# Patient Record
Sex: FEMALE | Race: WHITE | NOT HISPANIC OR LATINO | Employment: FULL TIME | URBAN - METROPOLITAN AREA
[De-identification: names, ages, dates, MRNs, and addresses within clinical notes are randomized per-mention and may not be internally consistent; named-entity substitution may affect disease eponyms.]

---

## 2022-11-11 ENCOUNTER — OFFICE VISIT (OUTPATIENT)
Dept: PODIATRY | Facility: CLINIC | Age: 52
End: 2022-11-11

## 2022-11-11 VITALS — OXYGEN SATURATION: 99 % | HEART RATE: 65 BPM | WEIGHT: 159.4 LBS | BODY MASS INDEX: 26.56 KG/M2 | HEIGHT: 65 IN

## 2022-11-11 DIAGNOSIS — M79.672 BILATERAL FOOT PAIN: ICD-10-CM

## 2022-11-11 DIAGNOSIS — Q82.8 PUNCTATE POROKERATOSIS: Primary | ICD-10-CM

## 2022-11-11 DIAGNOSIS — M24.573 EQUINUS CONTRACTURE OF ANKLE: ICD-10-CM

## 2022-11-11 DIAGNOSIS — M79.671 BILATERAL FOOT PAIN: ICD-10-CM

## 2022-11-11 PROCEDURE — 99203 OFFICE O/P NEW LOW 30 MIN: CPT

## 2022-11-11 RX ORDER — BUPRENORPHINE AND NALOXONE 8; 2 MG/1; MG/1
FILM, SOLUBLE BUCCAL; SUBLINGUAL
COMMUNITY

## 2022-11-11 RX ORDER — NALOXEGOL OXALATE 25 MG/1
TABLET, FILM COATED ORAL
COMMUNITY

## 2022-11-11 RX ORDER — NALOXONE HYDROCHLORIDE 4 MG/.1ML
SPRAY NASAL
COMMUNITY

## 2022-11-11 RX ORDER — ERGOCALCIFEROL 1.25 MG/1
CAPSULE ORAL
COMMUNITY

## 2022-11-11 RX ORDER — BUPRENORPHINE AND NALOXONE 8; 2 MG/1; MG/1
FILM, SOLUBLE BUCCAL; SUBLINGUAL
COMMUNITY
Start: 2022-10-26

## 2022-11-11 RX ORDER — POTASSIUM CHLORIDE 750 MG/1
TABLET, FILM COATED, EXTENDED RELEASE ORAL
COMMUNITY

## 2022-11-11 RX ORDER — BUPRENORPHINE HYDROCHLORIDE AND NALOXONE HYDROCHLORIDE DIHYDRATE 8; 2 MG/1; MG/1
TABLET SUBLINGUAL
COMMUNITY
Start: 2022-08-29

## 2022-11-11 RX ORDER — UREA 40 %
1 CREAM (GRAM) TOPICAL 2 TIMES DAILY
Qty: 60 EACH | Refills: 3
Start: 2022-11-11

## 2022-11-11 RX ORDER — BUPRENORPHINE HYDROCHLORIDE AND NALOXONE HYDROCHLORIDE DIHYDRATE 8; 2 MG/1; MG/1
TABLET SUBLINGUAL
COMMUNITY

## 2022-11-11 RX ORDER — PAROXETINE 10 MG/1
TABLET, FILM COATED ORAL
COMMUNITY

## 2022-11-11 NOTE — PROGRESS NOTES
11/11/2022  Foot and Ankle Surgery - New Patient   Provider: CANDICE Soriano   Location: [facility]    Subjective:  Jacy Andino is a 52 y.o. female.     Chief Complaint   Patient presents with   • Left Foot - Callouses   • Right Foot - Callouses   • Initial Evaluation     A Joe VILLARREAL 10/19/2022       The patient is here today for calluses and corns of feet. She reports they corns may be caused by her new work shoes. She states she has dry feet. She reports sensitivity after ambulating on her feet all day. She states moisturizes her feet twice daily.     No Known Allergies    History reviewed. No pertinent past medical history.    History reviewed. No pertinent surgical history.    Family History   Problem Relation Age of Onset   • Heart disease Mother    • Cancer Father        Social History     Socioeconomic History   • Marital status:    Tobacco Use   • Smoking status: Former     Packs/day: 1.00     Years: 30.00     Pack years: 30.00     Types: Cigarettes     Passive exposure: Never   • Smokeless tobacco: Never   Vaping Use   • Vaping Use: Every day   • Substances: Flavoring   • Devices: RefOrganically Ma[employer]ble tank   Substance and Sexual Activity   • Sexual activity: Defer        Current Outpatient Medications on File Prior to Visit   Medication Sig Dispense Refill   • buprenorphine-naloxone (SUBOXONE) 8-2 MG film film buprenorphine 8 mg-naloxone 2 mg sublingual film   DISSOLVE 1 3/4 FILM UNDER THE TONGUE EVERY DAY     • buprenorphine-naloxone (SUBOXONE) 8-2 MG film film DISSOLVE 2 FILMS UNDER THE TONGUE ONCE DAILY.     • buprenorphine-naloxone (SUBOXONE) 8-2 MG per SL tablet buprenorphine 8 mg-naloxone 2 mg sublingual tablet   TAKE 1.75 TABLETS SUBLINGUALLY ONCE DAILY     • buprenorphine-naloxone (SUBOXONE) 8-2 MG per SL tablet TAKE 1.75 TABLETS SUBLINGUALLY ONCE DAILY     • ergocalciferol (ERGOCALCIFEROL) 1.25 MG (10535 UT) capsule Vitamin D2 1,250 mcg (50,000 unit) capsule   Take 1 capsule every  "week by oral route.     • Naloxegol Oxalate (Movantik) 25 MG tablet Movantik 25 mg tablet   TAKE 1 TABLET BY MOUTH EVERY DAY     • naloxone (NARCAN) 4 MG/0.1ML nasal spray Narcan 4 mg/actuation nasal spray     • PARoxetine (PAXIL) 10 MG tablet paroxetine 10 mg tablet   TAKE 1 TABLET BY MOUTH EVERY DAY IN THE EVENING     • potassium chloride 10 MEQ CR tablet potassium chloride ER 10 mEq tablet,extended release   TAKE 1 TABLET BY MOUTH EVERY DAY       No current facility-administered medications on file prior to visit.       Review of Systems:  General: Denies fever, chills, fatigue, and weakness.  Eyes: Denies vision loss, blurry vision, and excessive redness.  ENT: Denies hearing issues and difficulty swallowing.  Cardiovascular: Denies palpitations, chest pain, or syncopal episodes.  Respiratory: Denies shortness of breath, wheezing, and coughing.  GI: Denies abdominal pain, nausea, and vomiting.   : Denies frequency, hematuria, and urgency.  Musculoskeletal: Denies muscle cramps, joint pains, and stiffness.  Derm: Denies rash, open wounds, or suspicious lesions.  Neuro: Denies headaches, numbness, loss of coordination, and tremors.  Psych: Denies anxiety and depression.  Endocrine: Denies temperature intolerance and changes in appetite.  Heme: Denies bleeding disorders or abnormal bruising.     Objective   Pulse 65   Ht 165.1 cm (65\")   Wt 72.3 kg (159 lb 6.4 oz)   SpO2 99%   BMI 26.53 kg/m²     Foot/Ankle Exam:       General:   Appearance: appears stated age and healthy    Orientation: AAOx3    Affect: appropriate      VASCULAR      Right Foot Vascularity   Normal vascular exam    Dorsalis pedis:  2+  Posterior tibial:  2+  Skin Temperature: warm    Edema Grading:  None  CFT:  < 3 seconds  Pedal Hair Growth:  Present  Varicosities: none       Left Foot Vascularity   Normal vascular exam    Dorsalis pedis:  2+  Posterior tibial:  2+  Skin Temperature: warm    Edema Grading:  None  CFT:  < 3 seconds  Pedal " Hair Growth:  Present  Varicosities: none       MUSCULOSKELETAL      Right Foot Musculoskeletal   Ecchymosis:  None  Tenderness: right foot callus       Left Foot Musculoskeletal   Ecchymosis:  None  Tenderness: left foot callus       MUSCLE STRENGTH     Right Foot Muscle Strength   Normal strength    Foot dorsiflexion:  5  Foot plantar flexion:  5  Foot inversion:  5  Foot eversion:  5     DERMATOLOGIC     Right Foot Dermatologic   Skin: corn    Nails: normal       Left Foot Dermatologic   Skin: corn    Nails: normal        Right Foot Additional Comments Hyperkeratotic skin to bilateral 5th met head.        Assessment & Plan   Diagnoses and all orders for this visit:    1. Punctate porokeratosis (Primary)    2. Bilateral foot pain    3. Equinus contracture of ankle    Other orders  -     urea (CARMOL) 40 % cream; Apply 1 application topically to the appropriate area as directed 2 (Two) Times a Day. Apply twice daily to calluses.  Dispense: 60 each; Refill: 3        Patient has signs and symptoms consistent with punctate keratosis or porokeratosis. Pathophysiology was discussed. Did recommend pairing of the lesions and removal with application of cantherone and under occlusion. However, patient is concerned regarding discomfort and would like to try a gentler approach at first. Recommend patient obtain over the counter corn treatment and use as directed. We will also recommend urea based moisturizer. Additionally, will recommend over the counter arch supports with metatarsal pad to help stabilize and support foot structure and redistribute forefoot pressure with calf stretching exercises to help limber calf muscles and decrease forefoot pressure as well. Patient has opted to follow-up on an as needed basis, which I feel is appropriate at this time. Patient verbalized understanding and agreeable to plan at this time.    No orders of the defined types were placed in this encounter.       Transcribed from ambient  dictation for CANDICE Blankenship by Troy Strickland.  11/11/22   15:36 EST    Patient or patient representative verbalized consent to the visit recording.  I have personally performed the services described in this document as transcribed by the above individual, and it is both accurate and complete.  CANDICE Blankenship  11/12/2022  11:53 EST

## 2024-03-01 ENCOUNTER — OFFICE (OUTPATIENT)
Dept: URBAN - METROPOLITAN AREA PATHOLOGY 19 | Facility: PATHOLOGY | Age: 54
End: 2024-03-01
Payer: COMMERCIAL

## 2024-03-01 ENCOUNTER — ON CAMPUS - OUTPATIENT (OUTPATIENT)
Dept: URBAN - METROPOLITAN AREA HOSPITAL 2 | Facility: HOSPITAL | Age: 54
End: 2024-03-01
Payer: COMMERCIAL

## 2024-03-01 VITALS
SYSTOLIC BLOOD PRESSURE: 136 MMHG | SYSTOLIC BLOOD PRESSURE: 94 MMHG | DIASTOLIC BLOOD PRESSURE: 61 MMHG | SYSTOLIC BLOOD PRESSURE: 108 MMHG | DIASTOLIC BLOOD PRESSURE: 79 MMHG | DIASTOLIC BLOOD PRESSURE: 81 MMHG | HEART RATE: 83 BPM | SYSTOLIC BLOOD PRESSURE: 133 MMHG | DIASTOLIC BLOOD PRESSURE: 89 MMHG | HEART RATE: 77 BPM | HEART RATE: 61 BPM | WEIGHT: 153 LBS | RESPIRATION RATE: 18 BRPM | SYSTOLIC BLOOD PRESSURE: 117 MMHG | HEART RATE: 65 BPM | SYSTOLIC BLOOD PRESSURE: 116 MMHG | OXYGEN SATURATION: 98 % | DIASTOLIC BLOOD PRESSURE: 60 MMHG | DIASTOLIC BLOOD PRESSURE: 59 MMHG | SYSTOLIC BLOOD PRESSURE: 105 MMHG | DIASTOLIC BLOOD PRESSURE: 45 MMHG | HEIGHT: 65 IN | HEART RATE: 62 BPM | DIASTOLIC BLOOD PRESSURE: 50 MMHG | SYSTOLIC BLOOD PRESSURE: 80 MMHG | HEART RATE: 69 BPM | OXYGEN SATURATION: 100 % | OXYGEN SATURATION: 96 % | HEART RATE: 82 BPM | OXYGEN SATURATION: 99 % | RESPIRATION RATE: 16 BRPM

## 2024-03-01 DIAGNOSIS — D12.2 BENIGN NEOPLASM OF ASCENDING COLON: ICD-10-CM

## 2024-03-01 DIAGNOSIS — D12.3 BENIGN NEOPLASM OF TRANSVERSE COLON: ICD-10-CM

## 2024-03-01 DIAGNOSIS — Z12.11 ENCOUNTER FOR SCREENING FOR MALIGNANT NEOPLASM OF COLON: ICD-10-CM

## 2024-03-01 DIAGNOSIS — Z80.0 FAMILY HISTORY OF MALIGNANT NEOPLASM OF DIGESTIVE ORGANS: ICD-10-CM

## 2024-03-01 PROBLEM — K63.5 POLYP OF COLON: Status: ACTIVE | Noted: 2024-03-01

## 2024-03-01 LAB
GI HISTOLOGY: A. ASCENDING COLON: (no result)
GI HISTOLOGY: B. TRANSVERSE COLON: (no result)
GI HISTOLOGY: PDF REPORT: (no result)

## 2024-03-01 PROCEDURE — 45385 COLONOSCOPY W/LESION REMOVAL: CPT | Mod: 33 | Performed by: INTERNAL MEDICINE

## 2024-03-01 PROCEDURE — 88305 TISSUE EXAM BY PATHOLOGIST: CPT | Performed by: PATHOLOGY

## 2024-03-01 RX ADMIN — EPHEDRINE SULFATE 10 MG: 5 INJECTION INTRAVENOUS at 14:44
